# Patient Record
Sex: FEMALE | Race: WHITE | NOT HISPANIC OR LATINO
[De-identification: names, ages, dates, MRNs, and addresses within clinical notes are randomized per-mention and may not be internally consistent; named-entity substitution may affect disease eponyms.]

---

## 2017-04-03 ENCOUNTER — RX RENEWAL (OUTPATIENT)
Age: 82
End: 2017-04-03

## 2017-04-04 ENCOUNTER — APPOINTMENT (OUTPATIENT)
Dept: FAMILY MEDICINE | Facility: CLINIC | Age: 82
End: 2017-04-04

## 2017-04-10 ENCOUNTER — RX RENEWAL (OUTPATIENT)
Age: 82
End: 2017-04-10

## 2017-07-18 ENCOUNTER — APPOINTMENT (OUTPATIENT)
Dept: FAMILY MEDICINE | Facility: CLINIC | Age: 82
End: 2017-07-18

## 2017-07-20 LAB
HBA1C MFR BLD HPLC: 5.7
LDLC SERPL CALC-MCNC: 97

## 2017-09-06 ENCOUNTER — APPOINTMENT (OUTPATIENT)
Dept: FAMILY MEDICINE | Facility: CLINIC | Age: 82
End: 2017-09-06
Payer: MEDICARE

## 2017-09-06 PROCEDURE — 36415 COLL VENOUS BLD VENIPUNCTURE: CPT

## 2017-09-11 ENCOUNTER — APPOINTMENT (OUTPATIENT)
Dept: FAMILY MEDICINE | Facility: CLINIC | Age: 82
End: 2017-09-11
Payer: MEDICARE

## 2017-09-11 VITALS
HEIGHT: 60 IN | BODY MASS INDEX: 29.45 KG/M2 | DIASTOLIC BLOOD PRESSURE: 82 MMHG | WEIGHT: 150 LBS | SYSTOLIC BLOOD PRESSURE: 136 MMHG

## 2017-09-11 DIAGNOSIS — I25.10 ATHEROSCLEROTIC HEART DISEASE OF NATIVE CORONARY ARTERY W/OUT ANGINA PECTORIS: ICD-10-CM

## 2017-09-11 PROCEDURE — G0008: CPT

## 2017-09-11 PROCEDURE — 90670 PCV13 VACCINE IM: CPT | Mod: RT

## 2017-09-11 PROCEDURE — 90662 IIV NO PRSV INCREASED AG IM: CPT | Mod: LT

## 2017-09-11 PROCEDURE — G0009: CPT

## 2017-09-11 PROCEDURE — 99215 OFFICE O/P EST HI 40 MIN: CPT | Mod: 25

## 2017-09-21 ENCOUNTER — APPOINTMENT (OUTPATIENT)
Dept: FAMILY MEDICINE | Facility: CLINIC | Age: 82
End: 2017-09-21
Payer: MEDICARE

## 2017-09-21 ENCOUNTER — APPOINTMENT (OUTPATIENT)
Dept: FAMILY MEDICINE | Facility: CLINIC | Age: 82
End: 2017-09-21

## 2017-09-21 PROCEDURE — 36415 COLL VENOUS BLD VENIPUNCTURE: CPT

## 2017-09-25 LAB
HBA1C MFR BLD HPLC: 5.4
LDLC SERPL CALC-MCNC: 117

## 2017-10-10 ENCOUNTER — APPOINTMENT (OUTPATIENT)
Dept: FAMILY MEDICINE | Facility: CLINIC | Age: 82
End: 2017-10-10
Payer: MEDICARE

## 2017-10-10 VITALS
DIASTOLIC BLOOD PRESSURE: 62 MMHG | HEIGHT: 60 IN | HEART RATE: 77 BPM | SYSTOLIC BLOOD PRESSURE: 106 MMHG | RESPIRATION RATE: 16 BRPM | WEIGHT: 150 LBS | BODY MASS INDEX: 29.45 KG/M2

## 2017-10-10 DIAGNOSIS — E78.5 HYPERLIPIDEMIA, UNSPECIFIED: ICD-10-CM

## 2017-10-10 DIAGNOSIS — E03.9 HYPOTHYROIDISM, UNSPECIFIED: ICD-10-CM

## 2017-10-10 PROCEDURE — 99215 OFFICE O/P EST HI 40 MIN: CPT | Mod: 25

## 2017-10-10 PROCEDURE — 93000 ELECTROCARDIOGRAM COMPLETE: CPT

## 2017-10-10 RX ORDER — LEVOTHYROXINE SODIUM 50 UG/1
50 TABLET ORAL DAILY
Qty: 90 | Refills: 3 | Status: ACTIVE | COMMUNITY
Start: 2017-07-20 | End: 1900-01-01

## 2017-11-09 ENCOUNTER — APPOINTMENT (OUTPATIENT)
Dept: FAMILY MEDICINE | Facility: CLINIC | Age: 82
End: 2017-11-09
Payer: MEDICARE

## 2017-11-09 VITALS
BODY MASS INDEX: 29.45 KG/M2 | WEIGHT: 150 LBS | HEIGHT: 60 IN | SYSTOLIC BLOOD PRESSURE: 111 MMHG | DIASTOLIC BLOOD PRESSURE: 70 MMHG

## 2017-11-09 DIAGNOSIS — Z87.19 PERSONAL HISTORY OF OTHER DISEASES OF THE DIGESTIVE SYSTEM: ICD-10-CM

## 2017-11-09 DIAGNOSIS — I34.1 NONRHEUMATIC MITRAL (VALVE) PROLAPSE: ICD-10-CM

## 2017-11-09 DIAGNOSIS — M54.9 DORSALGIA, UNSPECIFIED: ICD-10-CM

## 2017-11-09 DIAGNOSIS — Z00.00 ENCOUNTER FOR GENERAL ADULT MEDICAL EXAMINATION W/OUT ABNORMAL FINDINGS: ICD-10-CM

## 2017-11-09 DIAGNOSIS — Z82.49 FAMILY HISTORY OF ISCHEMIC HEART DISEASE AND OTHER DISEASES OF THE CIRCULATORY SYSTEM: ICD-10-CM

## 2017-11-09 DIAGNOSIS — J45.909 UNSPECIFIED ASTHMA, UNCOMPLICATED: ICD-10-CM

## 2017-11-09 DIAGNOSIS — I25.10 ATHEROSCLEROTIC HEART DISEASE OF NATIVE CORONARY ARTERY W/OUT ANGINA PECTORIS: ICD-10-CM

## 2017-11-09 DIAGNOSIS — R73.01 IMPAIRED FASTING GLUCOSE: ICD-10-CM

## 2017-11-09 DIAGNOSIS — G47.62 SLEEP RELATED LEG CRAMPS: ICD-10-CM

## 2017-11-09 LAB
BILIRUB UR QL STRIP: NORMAL
CLARITY UR: CLEAR
COLLECTION METHOD: NORMAL
GLUCOSE UR-MCNC: NORMAL
HCG UR QL: 0.2 EU/DL
HGB UR QL STRIP.AUTO: NORMAL
KETONES UR-MCNC: NORMAL
LEUKOCYTE ESTERASE UR QL STRIP: NORMAL
NITRITE UR QL STRIP: NORMAL
PH UR STRIP: 5.5
PROT UR STRIP-MCNC: NORMAL
SP GR UR STRIP: 1.01

## 2017-11-09 PROCEDURE — 81003 URINALYSIS AUTO W/O SCOPE: CPT | Mod: QW

## 2017-11-09 PROCEDURE — 36415 COLL VENOUS BLD VENIPUNCTURE: CPT

## 2017-11-09 PROCEDURE — 99215 OFFICE O/P EST HI 40 MIN: CPT | Mod: 25

## 2017-11-09 RX ORDER — AZELASTINE HYDROCHLORIDE 137 UG/1
0.1 SPRAY, METERED NASAL
Qty: 30 | Refills: 0 | Status: DISCONTINUED | COMMUNITY
Start: 2017-10-05 | End: 2017-11-09

## 2017-11-10 LAB
HBA1C MFR BLD HPLC: 5.5
LDLC SERPL CALC-MCNC: 100

## 2018-04-10 ENCOUNTER — APPOINTMENT (OUTPATIENT)
Dept: FAMILY MEDICINE | Facility: CLINIC | Age: 83
End: 2018-04-10

## 2021-04-22 ENCOUNTER — NON-APPOINTMENT (OUTPATIENT)
Age: 86
End: 2021-04-22

## 2021-04-22 ENCOUNTER — APPOINTMENT (OUTPATIENT)
Dept: ORTHOPEDIC SURGERY | Facility: CLINIC | Age: 86
End: 2021-04-22
Payer: MEDICARE

## 2021-04-22 VITALS
SYSTOLIC BLOOD PRESSURE: 164 MMHG | BODY MASS INDEX: 28.07 KG/M2 | HEART RATE: 68 BPM | HEIGHT: 60 IN | DIASTOLIC BLOOD PRESSURE: 81 MMHG | WEIGHT: 143 LBS

## 2021-04-22 DIAGNOSIS — M25.562 PAIN IN RIGHT KNEE: ICD-10-CM

## 2021-04-22 DIAGNOSIS — M25.561 PAIN IN RIGHT KNEE: ICD-10-CM

## 2021-04-22 PROCEDURE — 99203 OFFICE O/P NEW LOW 30 MIN: CPT | Mod: 25

## 2021-04-22 PROCEDURE — 73562 X-RAY EXAM OF KNEE 3: CPT | Mod: LT

## 2021-04-22 PROCEDURE — 20610 DRAIN/INJ JOINT/BURSA W/O US: CPT

## 2021-04-22 PROCEDURE — 73564 X-RAY EXAM KNEE 4 OR MORE: CPT | Mod: RT

## 2021-04-22 RX ORDER — UBIDECARENONE 200 MG
CAPSULE ORAL
Refills: 0 | Status: ACTIVE | COMMUNITY

## 2021-04-22 RX ORDER — MULTIVIT-MIN/IRON/FOLIC ACID/K 18-600-40
CAPSULE ORAL
Refills: 0 | Status: ACTIVE | COMMUNITY

## 2021-04-22 RX ORDER — BIOTIN 10 MG
TABLET ORAL
Refills: 0 | Status: ACTIVE | COMMUNITY

## 2021-04-22 RX ORDER — THIAMINE MONONITRATE (VIT B1) 100 MG
100 TABLET ORAL
Refills: 0 | Status: ACTIVE | COMMUNITY

## 2021-04-22 RX ORDER — PNV NO.95/FERROUS FUM/FOLIC AC 28MG-0.8MG
TABLET ORAL
Refills: 0 | Status: ACTIVE | COMMUNITY

## 2021-04-22 NOTE — ADDENDUM
[FreeTextEntry1] : This note was authored by Cesar Bowen working as a medical scribe for Dr. Angel Diaz. The note was reviewed, edited, and revised by Dr. Angel Diaz whom is in agreement with the exam findings, imaging findings, and treatment plan. 04/22/2021.

## 2021-04-22 NOTE — CONSULT LETTER
[Dear  ___] : Dear  [unfilled], [Consult Letter:] : I had the pleasure of evaluating your patient, [unfilled]. [Please see my note below.] : Please see my note below. [Consult Closing:] : Thank you very much for allowing me to participate in the care of this patient.  If you have any questions, please do not hesitate to contact me. [Sincerely,] : Sincerely, [FreeTextEntry3] : Angel Diaz MD\par Chief of Joint Replacement\par [facility] Knee Replacement \par [facility]\par \par  [FreeTextEntry2] : KAREEM TOTH MD\par

## 2021-04-22 NOTE — PHYSICAL EXAM
[de-identified] : The patient appears well nourished  and in no apparent distress.  The patient is alert and oriented to person, place, and time.   Affect and mood appear normal. The head is normocephalic and atraumatic.  The eyes reveal normal sclera and extra ocular muscles are intact. The tongue is midline with no apparent lesions.  Skin shows normal turgor with no evidence of eczema or psoriasis.  No respiratory distress noted.  Sensation grossly intact.\par   [de-identified] : Exam of the left knee shows 2 mm laxity of the MCL in extension, discomfort with patella compression, 5 degree flexion contracture.\par Exam of the right knee shows right a moderate effusion, 5 degrees of varus which is fully correctable -5 to 125 degrees of flexion. 5/5 motor strength bilaterally distally. Sensation intact distally.  [de-identified] : Xray- 3 views of the left knee - well aligned and well fixed left knee replacement. \par  \par Xray- 4 views of the right knee shows severe medial compartment arthritis of the right knee.

## 2021-04-22 NOTE — DISCUSSION/SUMMARY
[de-identified] : The patient is a 89 year old female s/p left TKR by another surgeon with severe medial compartment arthritis of the right knee. Conservative options were discussed. She was given a cortisone injection in the right knee today.  The left knee is showing some early signs of patella component loosening and she is having some pain with stair climbing.  Given her elderly age I would recommend continued nonoperative management for now and observation with a follow-up radiograph of the left knee in 3 months to assess for any progression.

## 2021-04-22 NOTE — HISTORY OF PRESENT ILLNESS
[de-identified] : Ms. YAIR RANGEL is a 89 year old female presenting for evaluation of bilateral knee pain. She is status post left TKR more than 10 years ago. She states that over the past year she has developed bilateral knee pain, left worse than right. Her left knee pain is generalized to the entire knee, her right knee pain is localized medially and anteriorly.  Her symptoms are worse with weightbearing to be including walking long distance, going up and down the stairs, and rising from a seated position.  She denies any falls or trauma.  She denies any fevers or chills.  She denies any knee buckling or locking at this time.  She has not had any injections for the right knee.  She has not had recent physical therapy.\par PMHx: 4 Stents five years ago, now on an anticoagulant which she thinks is Eliquis but is not sure.

## 2021-04-22 NOTE — PROCEDURE
[de-identified] : Using sterile technique, 2cc of depomedrol 40mg/ml, 4cc of 1% plain lidocaine, and 2 cc 0.25% marcaine was drawn up into a sterile syringe. The right knee was then sterilely prepped with chlorhexidine. Ethyl chloride spray was used to anesthetize the skin and subQ tissue. The depomedrol/lidocaine/marcaine mixture was then injected into the knee joint in the anterolateral position. The patient tolerated the procedure well without difficulty. The patient was given instructions on the use of ice and anti-inflammatories post injection site soreness.\par

## 2021-07-30 ENCOUNTER — APPOINTMENT (OUTPATIENT)
Dept: ORTHOPEDIC SURGERY | Facility: CLINIC | Age: 86
End: 2021-07-30
Payer: MEDICARE

## 2021-07-30 VITALS
DIASTOLIC BLOOD PRESSURE: 71 MMHG | HEART RATE: 132 BPM | HEIGHT: 60 IN | WEIGHT: 143 LBS | BODY MASS INDEX: 28.07 KG/M2 | SYSTOLIC BLOOD PRESSURE: 203 MMHG

## 2021-07-30 PROCEDURE — 20610 DRAIN/INJ JOINT/BURSA W/O US: CPT | Mod: RT

## 2021-07-30 PROCEDURE — 99213 OFFICE O/P EST LOW 20 MIN: CPT | Mod: 25

## 2021-11-04 ENCOUNTER — APPOINTMENT (OUTPATIENT)
Dept: ORTHOPEDIC SURGERY | Facility: CLINIC | Age: 86
End: 2021-11-04

## 2021-11-19 ENCOUNTER — APPOINTMENT (OUTPATIENT)
Dept: ORTHOPEDIC SURGERY | Facility: CLINIC | Age: 86
End: 2021-11-19
Payer: MEDICARE

## 2021-11-19 VITALS
SYSTOLIC BLOOD PRESSURE: 187 MMHG | HEIGHT: 60 IN | WEIGHT: 143 LBS | BODY MASS INDEX: 28.07 KG/M2 | HEART RATE: 58 BPM | DIASTOLIC BLOOD PRESSURE: 69 MMHG

## 2021-11-19 PROCEDURE — 20610 DRAIN/INJ JOINT/BURSA W/O US: CPT | Mod: RT

## 2021-11-19 PROCEDURE — 99213 OFFICE O/P EST LOW 20 MIN: CPT | Mod: 25

## 2021-11-19 NOTE — HISTORY OF PRESENT ILLNESS
[de-identified] : Ms. YAIR RANGEL is a 89 year old female presenting for evaluation of right knee pain. She is status post left TKR more than 10 years ago. Her right knee pain is localized medially and anteriorly. Her symptoms are worse with weightbearing to be including walking long distance, going up and down the stairs, and rising from a seated position.  She denies any falls or trauma. She denies any fevers or chills. She denies any knee buckling or locking at this time. She had a steroid injection to the right knee in July of 20201 and notes great relief at the time, now pain has come back. She presents today to discuss further treatment options. \par PMHx: 4 Stents five years ago, now on an anticoagulant which she thinks is Eliquis but is not sure.

## 2021-11-19 NOTE — PROCEDURE
[de-identified] : Using sterile technique, 2cc of depomedrol 40mg/ml, 4cc of 1% plain lidocaine, and 2 cc 0.25% marcaine was drawn up into a sterile syringe. The right knee was then sterilely prepped with chlorhexidine. Ethyl chloride spray was used to anesthetize the skin and subQ tissue. The depomedrol/lidocaine/marcaine mixture was then injected into the knee joint in the anterolateral position. The patient tolerated the procedure well without difficulty. The patient was given instructions on the use of ice and anti-inflammatories post injection site soreness.\par

## 2021-11-19 NOTE — PHYSICAL EXAM
[de-identified] : The patient appears well nourished  and in no apparent distress.  The patient is alert and oriented to person, place, and time.   Affect and mood appear normal. The head is normocephalic and atraumatic.  The eyes reveal normal sclera and extra ocular muscles are intact. The tongue is midline with no apparent lesions.  Skin shows normal turgor with no evidence of eczema or psoriasis.  No respiratory distress noted.  Sensation grossly intact.\par   [de-identified] : Exam of the right knee shows right a moderate effusion, 5 degrees of varus which is fully correctable -5 to 125 degrees of flexion. 5/5 motor strength bilaterally distally. Sensation intact distally.  [de-identified] : Prior Xray- 4 views of the right knee shows severe medial compartment arthritis of the right knee.

## 2021-11-19 NOTE — DISCUSSION/SUMMARY
[de-identified] : The patient is a 89 year old female s/p left TKR by another surgeon with severe medial compartment arthritis of the right knee. Conservative options were discussed. She was given a cortisone injection in the right knee today. Patient tolerated well and will ice and elevate when home. She will follow up as needed.

## 2022-03-04 ENCOUNTER — APPOINTMENT (OUTPATIENT)
Dept: ORTHOPEDIC SURGERY | Facility: CLINIC | Age: 87
End: 2022-03-04
Payer: MEDICARE

## 2022-03-04 VITALS
HEIGHT: 60 IN | BODY MASS INDEX: 29.06 KG/M2 | DIASTOLIC BLOOD PRESSURE: 79 MMHG | HEART RATE: 87 BPM | SYSTOLIC BLOOD PRESSURE: 161 MMHG | WEIGHT: 148 LBS

## 2022-03-04 PROCEDURE — 20610 DRAIN/INJ JOINT/BURSA W/O US: CPT | Mod: RT

## 2022-03-04 PROCEDURE — 99213 OFFICE O/P EST LOW 20 MIN: CPT | Mod: 25

## 2022-03-04 NOTE — ADDENDUM
[FreeTextEntry1] : This note was authored by Kilo Currie working as a medical scribe for Kilo Sepulveda. The note was reviewed, edited, and revised by Kilo Sepulveda whom is in agreement with the exam findings, imaging findings, and treatment plan. 03/04/2022		\par

## 2022-03-04 NOTE — PROCEDURE
[de-identified] : Using sterile technique, 2cc of depomedrol 40mg/ml, 4cc of 1% plain lidocaine, and 2 cc 0.25% marcaine was drawn up into a sterile syringe. The right knee was then sterilely prepped with chlorhexidine. Ethyl chloride spray was used to anesthetize the skin and subQ tissue.  The depomedrol/lidocaine/marcaine mixture was then injected into the knee joint in the anterolateral position. The patient tolerated the procedure well without difficulty. The patient was given instructions on the use of ice and anti-inflammatories post injection site soreness.		\par

## 2022-03-04 NOTE — DISCUSSION/SUMMARY
[de-identified] : YAIR RANGEL is a 89 year female who presents with right knee advanced osteoarthritis. Nonoperative treatment options were discussed including antiinflammatories, physical therapy, intraarticular cortisone injections, and hyaluronic acid gel injections. The patient received another intraarticular cortisone injection in the right knee in the office today. She will ice and elevate when home. The patient will follow up in 6-8 weeks.

## 2022-03-04 NOTE — HISTORY OF PRESENT ILLNESS
[de-identified] : YAIR RANGEL is a 89 year female who presents with right knee pain with a history of right knee osteoarthritis. At her last visit in office on 11/19/2021, the patient received a cortisone injection in her right knee. This last injection gave the patient adequate relief from her pain. She states that as of 2 weeks ago her pain is now present again. Her pain is localized laterally and medially and worse on weightbearing activities. SHe is active with home exercise. She presents today hopeful to receive another cortisone injection in the joint.

## 2022-03-04 NOTE — PHYSICAL EXAM
[de-identified] : The patient appears well nourished  and in no apparent distress.  The patient is alert and oriented to person, place, and time.   Affect and mood appear normal. The head is normocephalic and atraumatic.  The eyes reveal normal sclera and extra ocular muscles are intact. The tongue is midline with no apparent lesions.  Skin shows normal turgor with no evidence of eczema or psoriasis.  No respiratory distress noted.  Sensation grossly intact.		  [de-identified] : Exam of the right knee: Skin is WNL, ROM   0 to 115 degrees of flexion measured with a goniometer. No joint line tenderness. \par 5/5 motor strength bilaterally distally. Sensation intact distally.		  [de-identified] : Prior X-ray: 4 views of the right knee demonstrate advanced medial compartment joint space narrowing.

## 2022-06-03 ENCOUNTER — APPOINTMENT (OUTPATIENT)
Dept: ORTHOPEDIC SURGERY | Facility: CLINIC | Age: 87
End: 2022-06-03

## 2022-07-15 ENCOUNTER — APPOINTMENT (OUTPATIENT)
Dept: ORTHOPEDIC SURGERY | Facility: CLINIC | Age: 87
End: 2022-07-15

## 2022-07-15 VITALS
DIASTOLIC BLOOD PRESSURE: 69 MMHG | HEART RATE: 69 BPM | WEIGHT: 148 LBS | BODY MASS INDEX: 29.06 KG/M2 | SYSTOLIC BLOOD PRESSURE: 146 MMHG | HEIGHT: 60 IN

## 2022-07-15 PROCEDURE — 99214 OFFICE O/P EST MOD 30 MIN: CPT | Mod: 25

## 2022-07-15 PROCEDURE — 20610 DRAIN/INJ JOINT/BURSA W/O US: CPT | Mod: RT

## 2022-07-15 NOTE — HISTORY OF PRESENT ILLNESS
[de-identified] : YAIR RANGEL is a 89 year female who presents with right knee pain with a history of right knee osteoarthritis. At her last visit in office on 3/4/22, the patient received a cortisone injection in her right knee. She felt this worked well however now pain has returned. Her pain is localized laterally and medially and worse on weightbearing activities. SHe is active with home exercise. She presents today hopeful to receive another cortisone injection in the joint.

## 2022-07-15 NOTE — PHYSICAL EXAM
[de-identified] : The patient appears well nourished  and in no apparent distress.  The patient is alert and oriented to person, place, and time.   Affect and mood appear normal. The head is normocephalic and atraumatic.  The eyes reveal normal sclera and extra ocular muscles are intact. The tongue is midline with no apparent lesions.  Skin shows normal turgor with no evidence of eczema or psoriasis.  No respiratory distress noted.  Sensation grossly intact.		  [de-identified] : Exam of the right knee: Skin is WNL, ROM   0 to 115 degrees of flexion measured with a goniometer. No joint line tenderness. \par 5/5 motor strength bilaterally distally. Sensation intact distally.		  [de-identified] : Prior X-ray: 4 views of the right knee demonstrate advanced medial compartment joint space narrowing.

## 2022-07-15 NOTE — PROCEDURE
[de-identified] : Using sterile technique, 2cc of depomedrol 40mg/ml, 4cc of 1% plain lidocaine, and 2 cc 0.25% marcaine was drawn up into a sterile syringe. The right knee was then sterilely prepped with chlorhexidine. Ethyl chloride spray was used to anesthetize the skin and subQ tissue.  The depomedrol/lidocaine/marcaine mixture was then injected into the knee joint in the anterolateral position. The patient tolerated the procedure well without difficulty. The patient was given instructions on the use of ice and anti-inflammatories post injection site soreness.		\par

## 2022-07-15 NOTE — DISCUSSION/SUMMARY
[de-identified] : YAIR RANGEL is a 89 year female who presents with right knee advanced osteoarthritis. Nonoperative treatment options were discussed including antiinflammatories, physical therapy, intraarticular cortisone injections, and hyaluronic acid gel injections. The patient received another intraarticular cortisone injection in the right knee in the office today. She will ice and elevate when home. The patient will follow up in 6-8 weeks.

## 2022-09-29 ENCOUNTER — APPOINTMENT (OUTPATIENT)
Dept: ORTHOPEDIC SURGERY | Facility: CLINIC | Age: 87
End: 2022-09-29

## 2022-10-21 ENCOUNTER — APPOINTMENT (OUTPATIENT)
Dept: ORTHOPEDIC SURGERY | Facility: CLINIC | Age: 87
End: 2022-10-21

## 2022-10-21 VITALS
HEIGHT: 60 IN | WEIGHT: 148 LBS | BODY MASS INDEX: 29.06 KG/M2 | SYSTOLIC BLOOD PRESSURE: 157 MMHG | HEART RATE: 63 BPM | DIASTOLIC BLOOD PRESSURE: 65 MMHG

## 2022-10-21 PROCEDURE — 20610 DRAIN/INJ JOINT/BURSA W/O US: CPT | Mod: RT

## 2022-10-21 PROCEDURE — 99214 OFFICE O/P EST MOD 30 MIN: CPT | Mod: 25

## 2022-10-21 NOTE — HISTORY OF PRESENT ILLNESS
[de-identified] : YAIR RANGEL is a 89 year female who presents with right knee pain with a history of right knee osteoarthritis. Patient was last seen on 7/15/22 at which point  the patient received a cortisone injection in her right knee. She felt this worked well however now pain has returned. Her pain is localized laterally and medially and worse on weightbearing activities. SHe is active with home exercise. She presents today hopeful to receive another cortisone injection in the joint.

## 2022-10-21 NOTE — PROCEDURE
[de-identified] : Using sterile technique, 2cc of depomedrol 40mg/ml, 4cc of 1% plain lidocaine, and 2 cc 0.25% marcaine was drawn up into a sterile syringe. The right knee was then sterilely prepped with chlorhexidine. Ethyl chloride spray was used to anesthetize the skin and subQ tissue.  The depomedrol/lidocaine/marcaine mixture was then injected into the knee joint in the anterolateral position. The patient tolerated the procedure well without difficulty. The patient was given instructions on the use of ice and anti-inflammatories post injection site soreness.		\par

## 2022-10-21 NOTE — DISCUSSION/SUMMARY
[de-identified] : YAIR RANGEL is a 89 year female who presents with right knee advanced osteoarthritis. Nonoperative treatment options were discussed including antiinflammatories, physical therapy, intraarticular cortisone injections, and hyaluronic acid gel injections. The patient received another intraarticular cortisone injection in the right knee in the office today. She will ice and elevate when home. The patient will follow up in 6-8 weeks.

## 2022-10-21 NOTE — PHYSICAL EXAM
[de-identified] : The patient appears well nourished  and in no apparent distress.  The patient is alert and oriented to person, place, and time.   Affect and mood appear normal. The head is normocephalic and atraumatic.  The eyes reveal normal sclera and extra ocular muscles are intact. The tongue is midline with no apparent lesions.  Skin shows normal turgor with no evidence of eczema or psoriasis.  No respiratory distress noted.  Sensation grossly intact.		  [de-identified] : Exam of the right knee: Skin is WNL, ROM   0 to 115 degrees of flexion measured with a goniometer. No joint line tenderness. \par 5/5 motor strength bilaterally distally. Sensation intact distally.		  [de-identified] : Prior X-ray: 4 views of the right knee demonstrate advanced medial compartment joint space narrowing.

## 2023-01-26 ENCOUNTER — APPOINTMENT (OUTPATIENT)
Dept: ORTHOPEDIC SURGERY | Facility: CLINIC | Age: 88
End: 2023-01-26
Payer: MEDICARE

## 2023-01-26 VITALS
HEART RATE: 83 BPM | BODY MASS INDEX: 29.06 KG/M2 | DIASTOLIC BLOOD PRESSURE: 71 MMHG | SYSTOLIC BLOOD PRESSURE: 161 MMHG | HEIGHT: 60 IN | WEIGHT: 148 LBS

## 2023-01-26 PROCEDURE — 20610 DRAIN/INJ JOINT/BURSA W/O US: CPT | Mod: RT

## 2023-01-26 PROCEDURE — 99214 OFFICE O/P EST MOD 30 MIN: CPT | Mod: 25

## 2023-01-26 NOTE — DISCUSSION/SUMMARY
[de-identified] : YAIR RANGEL is a 89 year female who presents with right knee advanced osteoarthritis. Nonoperative treatment options were discussed including antiinflammatories, physical therapy, intraarticular cortisone injections, and hyaluronic acid gel injections. The patient received another intraarticular cortisone injection in the right knee in the office today. She tolerated well and will ice and elevate when home. The patient will follow up in 6-8 weeks.

## 2023-01-26 NOTE — PHYSICAL EXAM
[de-identified] : The patient appears well nourished  and in no apparent distress.  The patient is alert and oriented to person, place, and time.   Affect and mood appear normal. The head is normocephalic and atraumatic.  The eyes reveal normal sclera and extra ocular muscles are intact. The tongue is midline with no apparent lesions.  Skin shows normal turgor with no evidence of eczema or psoriasis.  No respiratory distress noted.  Sensation grossly intact.		  [de-identified] : Exam of the right knee: Skin is WNL, ROM   0 to 115 degrees of flexion measured with a goniometer. No joint line tenderness. \par 5/5 motor strength bilaterally distally. Sensation intact distally.		  [de-identified] : Prior X-ray: 4 views of the right knee demonstrate advanced medial compartment joint space narrowing.

## 2023-01-26 NOTE — HISTORY OF PRESENT ILLNESS
[de-identified] : YAIR RANGEL is a 89 year female who presents with right knee pain with a history of right knee osteoarthritis. Patient was last seen on 10/21/22 at which point  the patient received a cortisone injection in her right knee. She felt this worked well however now pain has returned. Her pain is localized laterally and medially and worse on weightbearing activities. She is active with home exercise. She presents today hopeful to receive another cortisone injection in the joint.

## 2023-01-26 NOTE — PROCEDURE
[de-identified] : Using sterile technique, 2cc of depomedrol 40mg/ml, 4cc of 1% plain lidocaine, and 2 cc 0.25% marcaine was drawn up into a sterile syringe. The right knee was then sterilely prepped with chlorhexidine. Ethyl chloride spray was used to anesthetize the skin and subQ tissue.  The depomedrol/lidocaine/marcaine mixture was then injected into the knee joint in the anterolateral position. The patient tolerated the procedure well without difficulty. The patient was given instructions on the use of ice and anti-inflammatories post injection site soreness.		\par

## 2023-03-10 ENCOUNTER — APPOINTMENT (OUTPATIENT)
Dept: ORTHOPEDIC SURGERY | Facility: CLINIC | Age: 88
End: 2023-03-10
Payer: MEDICARE

## 2023-03-10 PROCEDURE — 99213 OFFICE O/P EST LOW 20 MIN: CPT | Mod: 25

## 2023-03-10 PROCEDURE — 20610 DRAIN/INJ JOINT/BURSA W/O US: CPT | Mod: RT

## 2023-03-10 NOTE — DISCUSSION/SUMMARY
[de-identified] : YAIR RANGEL is a 89 year female who presents with right knee advanced osteoarthritis. Nonoperative treatment options were discussed including antiinflammatories, physical therapy, intraarticular cortisone injections, and hyaluronic acid gel injections. The patient received the first of five genvisc injections and tolerated well. Follow up in one week.

## 2023-03-10 NOTE — PHYSICAL EXAM
[de-identified] : The patient appears well nourished  and in no apparent distress.  The patient is alert and oriented to person, place, and time.   Affect and mood appear normal. The head is normocephalic and atraumatic.  The eyes reveal normal sclera and extra ocular muscles are intact. The tongue is midline with no apparent lesions.  Skin shows normal turgor with no evidence of eczema or psoriasis.  No respiratory distress noted.  Sensation grossly intact.		  [de-identified] : Exam of the right knee: Skin is WNL, ROM   0 to 115 degrees of flexion measured with a goniometer. No joint line tenderness. \par 5/5 motor strength bilaterally distally. Sensation intact distally.		  [de-identified] : Prior X-ray: 4 views of the right knee demonstrate advanced medial compartment joint space narrowing.

## 2023-03-10 NOTE — PROCEDURE
[de-identified] : Allergies: The patient denies allergies to medications and has no allergies to chicken,eggs, or feathers.\par Procedure: The patient has been identified by name and date of birth. Patient confirms that we are treating the right knee today.\par The knee was prepped in the usual sterile fashion. The areas were cleansed with chlorhexadine, then sprayed with ethyl chloride. The patient was then injected with the Genvisc into the right knee in the anterolateral position. The patient tolerated the procedure well. The medication was delivered aseptically and atraumatically.\par Diagnosis: Osteoarthritis of the right knee\par Treatment: The patient was advised on the activities for today. I gave the patient instructions on postinjection ice and analgesia.\par

## 2023-03-10 NOTE — HISTORY OF PRESENT ILLNESS
[de-identified] : YAIR RANGEL is a 89 year female who presents with right knee pain with a history of right knee osteoarthritis. Patient was last seen on 1/26/23 at which point  the patient received a cortisone injection in her right knee. She felt this worked well however now pain has returned. Her pain is localized laterally and medially and worse on weightbearing activities. She is active with home exercise. She presents today hopeful to discuss gel injections.

## 2023-03-17 ENCOUNTER — APPOINTMENT (OUTPATIENT)
Dept: ORTHOPEDIC SURGERY | Facility: CLINIC | Age: 88
End: 2023-03-17
Payer: MEDICARE

## 2023-03-17 VITALS — HEIGHT: 60 IN | WEIGHT: 148 LBS | BODY MASS INDEX: 29.06 KG/M2

## 2023-03-17 PROCEDURE — 20610 DRAIN/INJ JOINT/BURSA W/O US: CPT | Mod: RT

## 2023-03-17 NOTE — REASON FOR VISIT
[Follow-Up Visit] : a follow-up visit for [Other: ____] : [unfilled] [FreeTextEntry2] : Right knee Genvisc inj#2 Lot# [id number] Expires on 2024/11/30

## 2023-03-17 NOTE — HISTORY OF PRESENT ILLNESS
[de-identified] : The patient is here today for a Genvisc injection for the right knee. The patient is having osteoarthritic symptoms. \par Allergies: The patient denies allergies to medications and has no allergies to chicken,eggs, or feathers.\par Procedure: The patient has been identified by name and date of birth. Patient confirms that we are treating the right knee today.\par The knee was prepped in the usual sterile fashion. The areas were cleansed with chlorhexadine, then sprayed with ethyl chloride. The patient was then injected with the Genvisc into the right knee in the anterolateral position. The patient tolerated the procedure well. The medication was delivered aseptically and atraumatically.\par Diagnosis: Osteoarthritis of the right knee\par Treatment: The patient was advised on the activities for today. I gave the patient instructions on postinjection ice and analgesia.\par Follow up in one week.

## 2023-03-31 ENCOUNTER — APPOINTMENT (OUTPATIENT)
Dept: ORTHOPEDIC SURGERY | Facility: CLINIC | Age: 88
End: 2023-03-31
Payer: MEDICARE

## 2023-03-31 PROCEDURE — 20610 DRAIN/INJ JOINT/BURSA W/O US: CPT | Mod: RT

## 2023-03-31 NOTE — HISTORY OF PRESENT ILLNESS
[de-identified] : The patient is here today for the third Genvisc injection for the right knee. The patient is having osteoarthritic symptoms. \par Allergies: The patient denies allergies to medications and has no allergies to chicken,eggs, or feathers.\par Procedure: The patient has been identified by name and date of birth. Patient confirms that we are treating the right knee today.\par The knee was prepped in the usual sterile fashion. The areas were cleansed with chlorhexadine, then sprayed with ethyl chloride. The patient was then injected with the Genvisc into the right knee in the anterolateral position. The patient tolerated the procedure well. The medication was delivered aseptically and atraumatically.\par Diagnosis: Osteoarthritis of the right knee\par Treatment: The patient was advised on the activities for today. I gave the patient instructions on postinjection ice and analgesia.\par Follow up in one week.

## 2023-04-07 ENCOUNTER — APPOINTMENT (OUTPATIENT)
Dept: ORTHOPEDIC SURGERY | Facility: CLINIC | Age: 88
End: 2023-04-07
Payer: MEDICARE

## 2023-04-07 PROCEDURE — 20610 DRAIN/INJ JOINT/BURSA W/O US: CPT | Mod: RT

## 2023-04-07 NOTE — REASON FOR VISIT
[Follow-Up Visit] : a follow-up visit for [FreeTextEntry2] : Right knee Genvisc inj#4 Lot# [id number] Expires on 2024/11/30. \par  \par

## 2023-04-07 NOTE — HISTORY OF PRESENT ILLNESS
[de-identified] : The patient is here today for the fourth Genvisc injection for the right knee. The patient is having osteoarthritic symptoms. \par Allergies: The patient denies allergies to medications and has no allergies to chicken,eggs, or feathers.\par Procedure: The patient has been identified by name and date of birth. Patient confirms that we are treating the right knee today.\par The knee was prepped in the usual sterile fashion. The areas were cleansed with chlorhexadine, then sprayed with ethyl chloride. The patient was then injected with the Genvisc into the right knee in the anterolateral position. The patient tolerated the procedure well. The medication was delivered aseptically and atraumatically.\par Diagnosis: Osteoarthritis of the right knee\par Treatment: The patient was advised on the activities for today. I gave the patient instructions on postinjection ice and analgesia.\par Follow up in one week.

## 2023-04-14 ENCOUNTER — APPOINTMENT (OUTPATIENT)
Dept: ORTHOPEDIC SURGERY | Facility: CLINIC | Age: 88
End: 2023-04-14
Payer: MEDICARE

## 2023-04-14 VITALS — WEIGHT: 148 LBS | BODY MASS INDEX: 29.06 KG/M2 | HEIGHT: 60 IN

## 2023-04-14 PROCEDURE — 20610 DRAIN/INJ JOINT/BURSA W/O US: CPT | Mod: RT

## 2023-04-14 RX ORDER — MELOXICAM 7.5 MG/1
7.5 TABLET ORAL
Qty: 60 | Refills: 0 | Status: ACTIVE | COMMUNITY
Start: 2023-04-14 | End: 1900-01-01

## 2023-04-14 NOTE — HISTORY OF PRESENT ILLNESS
[de-identified] : The patient is here today for the fifth Genvisc injection for the right knee. The patient is having osteoarthritic symptoms. \par Allergies: The patient denies allergies to medications and has no allergies to chicken,eggs, or feathers.\par Procedure: The patient has been identified by name and date of birth. Patient confirms that we are treating the right knee today.\par The knee was prepped in the usual sterile fashion. The areas were cleansed with chlorhexadine, then sprayed with ethyl chloride. The patient was then injected with the Genvisc into the right knee in the anterolateral position. The patient tolerated the procedure well. The medication was delivered aseptically and atraumatically.\par Diagnosis: Osteoarthritis of the right knee\par Treatment: The patient was advised on the activities for today. I gave the patient instructions on postinjection ice and analgesia.\par Follow up in 6-8 weeks.

## 2023-04-14 NOTE — REASON FOR VISIT
[Follow-Up Visit] : a follow-up visit for [Other: ____] : [unfilled] [FreeTextEntry2] : Right knee Genvisc inj#5 Lot# [id number] Expires on 2024/11/30. \par

## 2023-04-27 ENCOUNTER — APPOINTMENT (OUTPATIENT)
Dept: ORTHOPEDIC SURGERY | Facility: CLINIC | Age: 88
End: 2023-04-27
Payer: MEDICARE

## 2023-04-27 VITALS — BODY MASS INDEX: 29.06 KG/M2 | HEIGHT: 60 IN | WEIGHT: 148 LBS

## 2023-04-27 PROCEDURE — 99213 OFFICE O/P EST LOW 20 MIN: CPT | Mod: 25

## 2023-04-27 PROCEDURE — 73564 X-RAY EXAM KNEE 4 OR MORE: CPT | Mod: RT

## 2023-04-27 PROCEDURE — 20610 DRAIN/INJ JOINT/BURSA W/O US: CPT | Mod: RT

## 2023-04-27 NOTE — HISTORY OF PRESENT ILLNESS
[de-identified] : YAIR RANGEL is a 89 year old female who presents with right knee pain. She has previously been diagnosed with osteoarthritis in the knee. Her pain is constant and worse with weightbearing activity. Her pain is worst medially. On 4/14/2023 the patient finished a series of Genvisc gel injections in the knee. She reports no relief from these injections, stating that since her last injection her pain level has increased significantly.  She presents today to follow up.

## 2023-04-27 NOTE — ADDENDUM
[FreeTextEntry1] : This note was authored by Kilo Currie working as a medical scribe for Dr. Angel Diaz. The note was reviewed, edited, and revised by Dr. Angel Diaz whom is in agreement with the exam findings, imaging findings, and treatment plan. 04/27/2023

## 2023-04-27 NOTE — PROCEDURE
[de-identified] : Using sterile technique, 2cc of depomedrol 40mg/ml, 4cc of 1% plain lidocaine, and 2 cc 0.25% marcaine was drawn up into a sterile syringe. The right knee was then sterilely prepped with chlorhexidine. Ethyl chloride spray was used to anesthetize the skin and subQ tissue.  The depomedrol/lidocaine/marcaine mixture was then injected into the knee joint in the anterolateral position. The patient tolerated the procedure well without difficulty. The patient was given instructions on the use of ice and anti-inflammatories post injection site soreness.		\par

## 2023-04-27 NOTE — DISCUSSION/SUMMARY
[de-identified] : YAIR RANGEL is a 89 year old female who presents with right knee bone on bone varus arthritis. Nonoperative treatment options for knee arthritis were discussed. Given that a recent series of gel injections failed to provide the patient with adequate pain relief, the patient received an intraarticular cortisone injection in the right knee in the office today. The patient will follow up 6 weeks post injection.

## 2023-04-27 NOTE — PHYSICAL EXAM
[de-identified] : The patient appears well nourished  and in no apparent distress.  The patient is alert and oriented to person, place, and time.   Affect and mood appear normal. The head is normocephalic and atraumatic.  The eyes reveal normal sclera and extra ocular muscles are intact. The tongue is midline with no apparent lesions.  Skin shows normal turgor with no evidence of eczema or psoriasis.  No respiratory distress noted.  Sensation grossly intact.		  [de-identified] : Exam of the right knee: Skin is WNL, ROM 0 to 115 degrees of flexion measured with a goniometer. No joint line tenderness. \par 5/5 motor strength bilaterally distally. Sensation intact distally.  [de-identified] : X-ray: 4 views of the right knee demonstrate bone on bone varus arthritis.

## 2023-05-16 ENCOUNTER — RX RENEWAL (OUTPATIENT)
Age: 88
End: 2023-05-16

## 2023-05-16 RX ORDER — MELOXICAM 7.5 MG/1
7.5 TABLET ORAL
Qty: 60 | Refills: 0 | Status: ACTIVE | COMMUNITY
Start: 2023-04-14 | End: 1900-01-01

## 2023-10-26 ENCOUNTER — APPOINTMENT (OUTPATIENT)
Dept: ORTHOPEDIC SURGERY | Facility: CLINIC | Age: 88
End: 2023-10-26
Payer: MEDICARE

## 2023-10-26 VITALS
HEART RATE: 75 BPM | DIASTOLIC BLOOD PRESSURE: 70 MMHG | BODY MASS INDEX: 29.06 KG/M2 | WEIGHT: 148 LBS | SYSTOLIC BLOOD PRESSURE: 102 MMHG | HEIGHT: 60 IN

## 2023-10-26 PROCEDURE — 99213 OFFICE O/P EST LOW 20 MIN: CPT | Mod: 25

## 2023-10-26 PROCEDURE — 20610 DRAIN/INJ JOINT/BURSA W/O US: CPT | Mod: RT

## 2024-01-12 ENCOUNTER — APPOINTMENT (OUTPATIENT)
Dept: ORTHOPEDIC SURGERY | Facility: CLINIC | Age: 89
End: 2024-01-12

## 2024-05-29 ENCOUNTER — APPOINTMENT (OUTPATIENT)
Dept: ORTHOPEDIC SURGERY | Facility: CLINIC | Age: 89
End: 2024-05-29
Payer: MEDICARE

## 2024-05-29 VITALS
WEIGHT: 148 LBS | HEIGHT: 60 IN | SYSTOLIC BLOOD PRESSURE: 157 MMHG | BODY MASS INDEX: 29.06 KG/M2 | DIASTOLIC BLOOD PRESSURE: 83 MMHG

## 2024-05-29 DIAGNOSIS — M17.11 UNILATERAL PRIMARY OSTEOARTHRITIS, RIGHT KNEE: ICD-10-CM

## 2024-05-29 PROCEDURE — 99213 OFFICE O/P EST LOW 20 MIN: CPT | Mod: 25

## 2024-05-29 PROCEDURE — 20610 DRAIN/INJ JOINT/BURSA W/O US: CPT | Mod: RT

## 2024-05-29 NOTE — HISTORY OF PRESENT ILLNESS
[de-identified] : YAIR RANGEL is a 89 year old female who presents with right knee pain. She has previously been diagnosed with osteoarthritis in the knee. Her pain is constant and worse with weightbearing activity. Her pain is worst medially. On 4/14/2023 the patient finished a series of Genvisc gel injections in the knee. She reports no relief from these injections, stating that since her last injection her pain level has increased significantly.  She then had a steroid injection in October 2023. This worked well but now pain has returned. She is hopeful for another injection today.

## 2024-05-29 NOTE — DISCUSSION/SUMMARY
[de-identified] : YAIR RANGEL is a 89 year old female who presents with right knee bone on bone varus arthritis. Nonoperative treatment options for knee arthritis were discussed. The patient received an intraarticular cortisone injection in the right knee in the office today. The patient will follow up 6 weeks post injection.

## 2024-05-29 NOTE — PROCEDURE
[de-identified] : Using sterile technique, 2cc of depomedrol 40mg/ml, 4cc of 1% plain lidocaine, and 2 cc 0.25% marcaine was drawn up into a sterile syringe. The right knee was then sterilely prepped with chlorhexidine. Ethyl chloride spray was used to anesthetize the skin and subQ tissue. The depomedrol/lidocaine/marcaine mixture was then injected into the knee joint in the anterolateral position. The patient tolerated the procedure well without difficulty. The patient was given instructions on the use of ice and anti-inflammatories post injection site soreness. 
No

## 2024-05-29 NOTE — PHYSICAL EXAM
[de-identified] : The patient appears well nourished  and in no apparent distress.  The patient is alert and oriented to person, place, and time.   Affect and mood appear normal. The head is normocephalic and atraumatic.  The eyes reveal normal sclera and extra ocular muscles are intact. The tongue is midline with no apparent lesions.  Skin shows normal turgor with no evidence of eczema or psoriasis.  No respiratory distress noted.  Sensation grossly intact.		  [de-identified] : Exam of the right knee: Skin is WNL, ROM 0 to 115 degrees of flexion measured with a goniometer. No joint line tenderness.  5/5 motor strength bilaterally distally. Sensation intact distally.  [de-identified] : Prior X-ray: 4 views of the right knee demonstrate bone on bone varus arthritis.

## 2024-08-28 ENCOUNTER — APPOINTMENT (OUTPATIENT)
Dept: ORTHOPEDIC SURGERY | Facility: CLINIC | Age: 89
End: 2024-08-28
Payer: MEDICARE

## 2024-08-28 VITALS
HEIGHT: 60 IN | SYSTOLIC BLOOD PRESSURE: 150 MMHG | BODY MASS INDEX: 29.06 KG/M2 | HEART RATE: 71 BPM | DIASTOLIC BLOOD PRESSURE: 81 MMHG | WEIGHT: 148 LBS

## 2024-08-28 DIAGNOSIS — M17.11 UNILATERAL PRIMARY OSTEOARTHRITIS, RIGHT KNEE: ICD-10-CM

## 2024-08-28 PROCEDURE — 20610 DRAIN/INJ JOINT/BURSA W/O US: CPT | Mod: RT

## 2024-08-28 PROCEDURE — 99213 OFFICE O/P EST LOW 20 MIN: CPT | Mod: 25

## 2024-08-28 NOTE — PROCEDURE
[de-identified] : Using sterile technique, 2cc of depomedrol 40mg/ml, 4cc of 1% plain lidocaine, and 2 cc 0.25% marcaine was drawn up into a sterile syringe. The right knee was then sterilely prepped with chlorhexidine. Ethyl chloride spray was used to anesthetize the skin and subQ tissue. The depomedrol/lidocaine/marcaine mixture was then injected into the knee joint in the anterolateral position. The patient tolerated the procedure well without difficulty. The patient was given instructions on the use of ice and anti-inflammatories post injection site soreness.

## 2024-08-28 NOTE — DISCUSSION/SUMMARY
[de-identified] : YAIR RANGEL is a 89 year old female who presents with right knee bone on bone varus arthritis. Nonoperative treatment options for knee arthritis were discussed. The patient received an intraarticular cortisone injection in the right knee in the office today. The patient will follow up 6 weeks post injection.

## 2024-08-28 NOTE — PHYSICAL EXAM
[de-identified] : The patient appears well nourished  and in no apparent distress.  The patient is alert and oriented to person, place, and time.   Affect and mood appear normal. The head is normocephalic and atraumatic.  The eyes reveal normal sclera and extra ocular muscles are intact. The tongue is midline with no apparent lesions.  Skin shows normal turgor with no evidence of eczema or psoriasis.  No respiratory distress noted.  Sensation grossly intact.		  [de-identified] : Exam of the right knee: Skin is WNL, ROM 0 to 115 degrees of flexion measured with a goniometer. No joint line tenderness.  5/5 motor strength bilaterally distally. Sensation intact distally.  [de-identified] : Prior X-ray: 4 views of the right knee demonstrate bone on bone varus arthritis.

## 2024-08-28 NOTE — HISTORY OF PRESENT ILLNESS
[de-identified] : YAIR RANGEL is a 89 year old female who presents with right knee pain. She has previously been diagnosed with osteoarthritis in the knee. Her pain is constant and worse with weightbearing activity. Her pain is worst medially. On 4/14/2023 the patient finished a series of Genvisc injections in the knee. She reports no relief from these injections, stating that since her last injection her pain level has increased significantly.  She then had a steroid injection in May 2024 which worked well but now pain has returned. She is hopeful for another injection today.

## 2024-11-27 ENCOUNTER — APPOINTMENT (OUTPATIENT)
Dept: ORTHOPEDIC SURGERY | Facility: CLINIC | Age: 89
End: 2024-11-27